# Patient Record
Sex: FEMALE | Race: OTHER | HISPANIC OR LATINO | URBAN - METROPOLITAN AREA
[De-identification: names, ages, dates, MRNs, and addresses within clinical notes are randomized per-mention and may not be internally consistent; named-entity substitution may affect disease eponyms.]

---

## 2022-05-01 ENCOUNTER — EMERGENCY (EMERGENCY)
Facility: HOSPITAL | Age: 52
LOS: 1 days | Discharge: HOME | End: 2022-05-01
Attending: EMERGENCY MEDICINE
Payer: COMMERCIAL

## 2022-05-01 VITALS
DIASTOLIC BLOOD PRESSURE: 107 MMHG | HEIGHT: 62 IN | OXYGEN SATURATION: 97 % | TEMPERATURE: 98 F | RESPIRATION RATE: 18 BRPM | WEIGHT: 173.94 LBS | HEART RATE: 89 BPM | SYSTOLIC BLOOD PRESSURE: 152 MMHG

## 2022-05-01 VITALS
TEMPERATURE: 98 F | HEART RATE: 76 BPM | OXYGEN SATURATION: 98 % | SYSTOLIC BLOOD PRESSURE: 153 MMHG | DIASTOLIC BLOOD PRESSURE: 84 MMHG | RESPIRATION RATE: 18 BRPM

## 2022-05-01 PROCEDURE — 99282 EMERGENCY DEPT VISIT SF MDM: CPT

## 2022-05-01 PROCEDURE — 99283 EMERGENCY DEPT VISIT LOW MDM: CPT

## 2022-05-01 RX ORDER — MECLIZINE HCL 12.5 MG
50 TABLET ORAL ONCE
Refills: 0 | Status: COMPLETED | OUTPATIENT
Start: 2022-05-01 | End: 2022-05-01

## 2022-05-01 RX ORDER — MECLIZINE HCL 12.5 MG
1 TABLET ORAL
Qty: 15 | Refills: 0
Start: 2022-05-01 | End: 2022-05-05

## 2022-05-01 RX ADMIN — Medication 50 MILLIGRAM(S): at 11:20

## 2022-05-01 NOTE — ED ADULT NURSE NOTE - OBJECTIVE STATEMENT
Pt A&Ox4, NAD. Pt presents to the ED with complaints of dizziness, N/V. Breathing even and unlabored. Pt speaking in full sentences.

## 2022-05-01 NOTE — ED PROVIDER NOTE - PROGRESS NOTE DETAILS
Jeffrey PGY-3: Pt reassessed, pt feeling better at this time, vss, pt able to walk, talk and vocalized plan of action. Discussed in depth and explained to pt in depth the next steps that need to be taking including proper follow up with PCP or specialists. All incidental findings were discussed with pt as well. Pt verbalized their concerns and all questions were answered. Pt understands dispo and wants discharge. Given good instructions when to return to ED and importance of f/u.

## 2022-05-01 NOTE — ED PROVIDER NOTE - PATIENT PORTAL LINK FT
You can access the FollowMyHealth Patient Portal offered by [facility] by registering at the following website: [email or web address]. By joining [facility]’s FollowMyHealth portal, you will also be able to view your health information using other applications (apps) compatible with our system.

## 2022-05-01 NOTE — ED PROVIDER NOTE - NSFOLLOWUPINSTRUCTIONS_ED_ALL_ED_FT
- Consulte con esparza médico en los próximos 3 días, ragini busque atención médica antes si britney síntomas persisten o empeoran. Llame mañana para concertar leroy dana. Si no puede hacer un seguimiento con esparza médico de atención primaria, regrese al servicio de urgencias por cualquier problema urgente.    - Se le entregó leroy copia de britney laboratorios e imágenes. Revíselos con esparza (s) médico (s).    - Si tiene algún empeoramiento de los síntomas o cualquier otra inquietud, consulte a esparza médico o regrese a la melissa de emergencias más cercana de inmediato.    - Siga tomando britney medicamentos caseros según las indicaciones. No consuma alcohol cuando esté tomando algún medicamento (especialmente antibióticos, tylenol u otros analgésicos) a menos que consulte con el médico o farmacéutico.    **********************************************************************************************************

## 2022-05-01 NOTE — ED PROVIDER NOTE - CARE PROVIDER_API CALL
Zac Siddiqui; PhD)  Neurology; Vascular Neurology  [address] [place] [address]  Phone: [phone]  Fax: [phone]  Follow Up Time:

## 2022-05-01 NOTE — ED PROVIDER NOTE - OBJECTIVE STATEMENT
51yoF; with no signif PMH; now p/w dizziness--acute onset, with movement, not present at rest, associated with nausea/vomiting. denies headache. denies numbness/tingling. denies focal weakness. denies cp/sob/palp.  denies f/c/s.  reports cough and congestion that began 3 days ago, now resolved.  denies tinnitus. denies hearing loss.   PMH: denies  SOCIAL: denies smoking / denies illicit substance use

## 2022-05-01 NOTE — ED ADULT NURSE NOTE - NSIMPLEMENTINTERV_GEN_ALL_ED
Implemented All Universal Safety Interventions:  Seth to call system. Call bell, personal items and telephone within reach. Instruct patient to call for assistance. Room bathroom lighting operational. Non-slip footwear when patient is off stretcher. Physically safe environment: no spills, clutter or unnecessary equipment. Stretcher in lowest position, wheels locked, appropriate side rails in place.

## 2022-05-02 PROBLEM — Z00.00 ENCOUNTER FOR PREVENTIVE HEALTH EXAMINATION: Status: ACTIVE | Noted: 2022-05-02

## 2022-07-25 ENCOUNTER — APPOINTMENT (OUTPATIENT)
Dept: NEUROLOGY | Facility: CLINIC | Age: 52
End: 2022-07-25

## 2022-07-25 VITALS
SYSTOLIC BLOOD PRESSURE: 118 MMHG | DIASTOLIC BLOOD PRESSURE: 74 MMHG | WEIGHT: 168 LBS | BODY MASS INDEX: 30.91 KG/M2 | HEIGHT: 62 IN

## 2022-07-25 DIAGNOSIS — Z78.9 OTHER SPECIFIED HEALTH STATUS: ICD-10-CM

## 2022-07-25 DIAGNOSIS — Z86.39 PERSONAL HISTORY OF OTHER ENDOCRINE, NUTRITIONAL AND METABOLIC DISEASE: ICD-10-CM

## 2022-07-25 DIAGNOSIS — H81.399 OTHER PERIPHERAL VERTIGO, UNSPECIFIED EAR: ICD-10-CM

## 2022-07-25 PROCEDURE — 99203 OFFICE O/P NEW LOW 30 MIN: CPT

## 2022-07-25 PROCEDURE — 99243 OFF/OP CNSLTJ NEW/EST LOW 30: CPT

## 2022-07-25 PROCEDURE — 99204 OFFICE O/P NEW MOD 45 MIN: CPT

## 2022-07-25 RX ORDER — LEVOTHYROXINE SODIUM 0.09 MG/1
88 TABLET ORAL
Qty: 30 | Refills: 0 | Status: ACTIVE | COMMUNITY
Start: 2021-10-28

## 2022-07-25 NOTE — REASON FOR VISIT
[Consultation] : a consultation visit [[employer] ] : provided by [employer]   [FreeTextEntry1] : Positional vertigo [TWNoteComboBox1] : [place]

## 2022-07-25 NOTE — HISTORY OF PRESENT ILLNESS
[FreeTextEntry1] : Initial office visit July 25, 2022:\par This is a 51-year-old woman who presents today for evaluation of vertigo.  At the end of April she had a bout of severe vertigo which was worsened by change in head position.  She went to the hospital and was told to follow-up with a neurologist.  Since then she has been having just intermittent bouts that are short-lived, about 5 minutes each.  She does get nauseated and was very nauseous when the initial event occurred in April.  She is here today for neurologic evaluation.

## 2022-07-25 NOTE — ASSESSMENT
[FreeTextEntry1] : This is a 51-year-old woman who now has intermittent vertigo.  Her last event was about 2 weeks ago.  At this point to rule out any intracranial pathology causing her vertigo I will send her for an MRI of her brain.  I will call her with the results, and bring her back into the office for any significant abnormalities that need to be addressed.

## 2022-07-25 NOTE — PHYSICAL EXAM
[General Appearance - Alert] : alert [General Appearance - In No Acute Distress] : in no acute distress [General Appearance - Well Nourished] : well nourished [General Appearance - Well Developed] : well developed [Oriented To Time, Place, And Person] : oriented to person, place, and time [Impaired Insight] : insight and judgment were intact [Affect] : the affect was normal [Mood] : the mood was normal [Person] : oriented to person [Place] : oriented to place [Time] : oriented to time [Remote Intact] : remote memory intact [Registration Intact] : recent registration memory intact [Span Intact] : the attention span was normal [Concentration Intact] : normal concentrating ability [Visual Intact] : visual attention was ~T not ~L decreased [Naming Objects] : no difficulty naming common objects [Repeating Phrases] : no difficulty repeating a phrase [Fluency] : fluency intact [Comprehension] : comprehension intact [Current Events] : adequate knowledge of current events [Past History] : adequate knowledge of personal past history [Cranial Nerves Optic (II)] : visual acuity intact bilaterally,  visual fields full to confrontation, pupils equal round and reactive to light [Cranial Nerves Oculomotor (III)] : extraocular motion intact [Cranial Nerves Trigeminal (V)] : facial sensation intact symmetrically [Cranial Nerves Facial (VII)] : face symmetrical [Cranial Nerves Vestibulocochlear (VIII)] : hearing was intact bilaterally [Cranial Nerves Glossopharyngeal (IX)] : tongue and palate midline [Cranial Nerves Accessory (XI - Cranial And Spinal)] : head turning and shoulder shrug symmetric [Cranial Nerves Hypoglossal (XII)] : there was no tongue deviation with protrusion [Motor Tone] : muscle tone was normal in all four extremities [Motor Strength] : muscle strength was normal in all four extremities [Involuntary Movements] : no involuntary movements were seen [No Muscle Atrophy] : normal bulk in all four extremities [Paresis Pronator Drift Right-Sided] : no pronator drift on the right [Paresis Pronator Drift Left-Sided] : no pronator drift on the left [Motor Strength Upper Extremities Bilaterally] : strength was normal in both upper extremities [Motor Strength Lower Extremities Bilaterally] : strength was normal in both lower extremities [Sensation Tactile Decrease] : light touch was intact [Sensation Pain / Temperature Decrease] : pain and temperature was intact [Sensation Vibration Decrease] : vibration was intact [Proprioception] : proprioception was intact [Abnormal Walk] : normal gait [Balance] : balance was intact [Tremor] : no tremor present [Coordination - Dysmetria Impaired Finger-to-Nose Bilateral] : not present [2+] : Patella left 2+ [Sclera] : the sclera and conjunctiva were normal [PERRL With Normal Accommodation] : pupils were equal in size, round, reactive to light, with normal accommodation [Extraocular Movements] : extraocular movements were intact [No APD] : no afferent pupillary defect [No MARION] : no internuclear ophthalmoplegia [Full Visual Field] : full visual field

## 2022-07-30 ENCOUNTER — OUTPATIENT (OUTPATIENT)
Dept: OUTPATIENT SERVICES | Facility: HOSPITAL | Age: 52
LOS: 1 days | End: 2022-07-30
Payer: COMMERCIAL

## 2022-07-30 ENCOUNTER — APPOINTMENT (OUTPATIENT)
Dept: MRI IMAGING | Facility: CLINIC | Age: 52
End: 2022-07-30

## 2022-07-30 DIAGNOSIS — H81.399 OTHER PERIPHERAL VERTIGO, UNSPECIFIED EAR: ICD-10-CM

## 2022-07-30 PROCEDURE — 70551 MRI BRAIN STEM W/O DYE: CPT | Mod: 26

## 2022-07-30 PROCEDURE — 70551 MRI BRAIN STEM W/O DYE: CPT

## 2023-11-08 NOTE — ED PROVIDER NOTE - PHYSICAL EXAMINATION
Call to [facility]-message relayed and said we can call Tomeka arizmendi. Call to Tomeka arizmendi no answer and left message for her to call back.     When she calls back will inform of referral placed and give her the referral line scheduling number.     Routed back to nurse pool to call tomorrow.    Gen: Alert, NAD  Head: NC, AT, PERRL, EOMI, normal lids/conjunctiva  Neck: +supple, no tenderness/meningismus/JVD, +Trachea midline  Pulm: Bilateral BS, normal resp effort, no wheeze/stridor/retractions  CV: RRR, no M/R/G, 2+dist pulses  Abd: soft, NT/ND, +BS, no hepatosplenomegaly  Mskel: ROM intact x4 extremities.  no edema/erythema/cyanosis  Skin: no rash, warm, dry  Neuro: AAOx3, no sensory/motor deficits, CN 2-12 intact, NIHSS=0, +[place]ix[place]ike with nystagmus to right